# Patient Record
Sex: MALE | Race: OTHER | NOT HISPANIC OR LATINO | Employment: FULL TIME | URBAN - METROPOLITAN AREA
[De-identification: names, ages, dates, MRNs, and addresses within clinical notes are randomized per-mention and may not be internally consistent; named-entity substitution may affect disease eponyms.]

---

## 2022-11-11 ENCOUNTER — HOSPITAL ENCOUNTER (EMERGENCY)
Facility: MEDICAL CENTER | Age: 37
End: 2022-11-12
Attending: EMERGENCY MEDICINE
Payer: COMMERCIAL

## 2022-11-11 DIAGNOSIS — F10.920 ALCOHOLIC INTOXICATION WITHOUT COMPLICATION (HCC): ICD-10-CM

## 2022-11-11 LAB
ALBUMIN SERPL BCP-MCNC: 4.5 G/DL (ref 3.2–4.9)
ALBUMIN/GLOB SERPL: 1.4 G/DL
ALP SERPL-CCNC: 46 U/L (ref 30–99)
ALT SERPL-CCNC: 19 U/L (ref 2–50)
ANION GAP SERPL CALC-SCNC: 18 MMOL/L (ref 7–16)
AST SERPL-CCNC: 26 U/L (ref 12–45)
BASOPHILS # BLD AUTO: 0.5 % (ref 0–1.8)
BASOPHILS # BLD: 0.04 K/UL (ref 0–0.12)
BILIRUB SERPL-MCNC: 0.3 MG/DL (ref 0.1–1.5)
BUN SERPL-MCNC: 12 MG/DL (ref 8–22)
CALCIUM SERPL-MCNC: 8.9 MG/DL (ref 8.5–10.5)
CHLORIDE SERPL-SCNC: 100 MMOL/L (ref 96–112)
CO2 SERPL-SCNC: 19 MMOL/L (ref 20–33)
CREAT SERPL-MCNC: 1.07 MG/DL (ref 0.5–1.4)
EOSINOPHIL # BLD AUTO: 0.04 K/UL (ref 0–0.51)
EOSINOPHIL NFR BLD: 0.5 % (ref 0–6.9)
ERYTHROCYTE [DISTWIDTH] IN BLOOD BY AUTOMATED COUNT: 42.1 FL (ref 35.9–50)
ETHANOL BLD-MCNC: 276.8 MG/DL
GFR SERPLBLD CREATININE-BSD FMLA CKD-EPI: 91 ML/MIN/1.73 M 2
GLOBULIN SER CALC-MCNC: 3.3 G/DL (ref 1.9–3.5)
GLUCOSE SERPL-MCNC: 119 MG/DL (ref 65–99)
HCT VFR BLD AUTO: 41.9 % (ref 42–52)
HGB BLD-MCNC: 14.3 G/DL (ref 14–18)
IMM GRANULOCYTES # BLD AUTO: 0.02 K/UL (ref 0–0.11)
IMM GRANULOCYTES NFR BLD AUTO: 0.2 % (ref 0–0.9)
LIPASE SERPL-CCNC: 66 U/L (ref 11–82)
LYMPHOCYTES # BLD AUTO: 2.5 K/UL (ref 1–4.8)
LYMPHOCYTES NFR BLD: 29.5 % (ref 22–41)
MCH RBC QN AUTO: 30.6 PG (ref 27–33)
MCHC RBC AUTO-ENTMCNC: 34.1 G/DL (ref 33.7–35.3)
MCV RBC AUTO: 89.7 FL (ref 81.4–97.8)
MONOCYTES # BLD AUTO: 0.51 K/UL (ref 0–0.85)
MONOCYTES NFR BLD AUTO: 6 % (ref 0–13.4)
NEUTROPHILS # BLD AUTO: 5.36 K/UL (ref 1.82–7.42)
NEUTROPHILS NFR BLD: 63.3 % (ref 44–72)
NRBC # BLD AUTO: 0 K/UL
NRBC BLD-RTO: 0 /100 WBC
PLATELET # BLD AUTO: 263 K/UL (ref 164–446)
PMV BLD AUTO: 10.2 FL (ref 9–12.9)
POTASSIUM SERPL-SCNC: 3.9 MMOL/L (ref 3.6–5.5)
PROT SERPL-MCNC: 7.8 G/DL (ref 6–8.2)
RBC # BLD AUTO: 4.67 M/UL (ref 4.7–6.1)
SODIUM SERPL-SCNC: 137 MMOL/L (ref 135–145)
WBC # BLD AUTO: 8.5 K/UL (ref 4.8–10.8)

## 2022-11-11 PROCEDURE — 80053 COMPREHEN METABOLIC PANEL: CPT

## 2022-11-11 PROCEDURE — 96374 THER/PROPH/DIAG INJ IV PUSH: CPT

## 2022-11-11 PROCEDURE — 99285 EMERGENCY DEPT VISIT HI MDM: CPT

## 2022-11-11 PROCEDURE — 700111 HCHG RX REV CODE 636 W/ 250 OVERRIDE (IP): Performed by: EMERGENCY MEDICINE

## 2022-11-11 PROCEDURE — 36415 COLL VENOUS BLD VENIPUNCTURE: CPT

## 2022-11-11 PROCEDURE — 85025 COMPLETE CBC W/AUTO DIFF WBC: CPT

## 2022-11-11 PROCEDURE — 700105 HCHG RX REV CODE 258: Performed by: EMERGENCY MEDICINE

## 2022-11-11 PROCEDURE — 83690 ASSAY OF LIPASE: CPT

## 2022-11-11 PROCEDURE — 82077 ASSAY SPEC XCP UR&BREATH IA: CPT

## 2022-11-11 RX ORDER — ONDANSETRON 2 MG/ML
4 INJECTION INTRAMUSCULAR; INTRAVENOUS ONCE
Status: COMPLETED | OUTPATIENT
Start: 2022-11-11 | End: 2022-11-11

## 2022-11-11 RX ORDER — SODIUM CHLORIDE 9 MG/ML
1000 INJECTION, SOLUTION INTRAVENOUS ONCE
Status: COMPLETED | OUTPATIENT
Start: 2022-11-11 | End: 2022-11-12

## 2022-11-11 RX ADMIN — SODIUM CHLORIDE 1000 ML: 9 INJECTION, SOLUTION INTRAVENOUS at 23:30

## 2022-11-11 RX ADMIN — ONDANSETRON 4 MG: 2 INJECTION INTRAMUSCULAR; INTRAVENOUS at 23:30

## 2022-11-11 ASSESSMENT — LIFESTYLE VARIABLES: DO YOU DRINK ALCOHOL: YES

## 2022-11-12 VITALS
TEMPERATURE: 97.6 F | HEIGHT: 71 IN | HEART RATE: 71 BPM | WEIGHT: 170 LBS | OXYGEN SATURATION: 97 % | BODY MASS INDEX: 23.8 KG/M2 | RESPIRATION RATE: 18 BRPM | DIASTOLIC BLOOD PRESSURE: 69 MMHG | SYSTOLIC BLOOD PRESSURE: 106 MMHG

## 2022-11-12 NOTE — ED PROVIDER NOTES
"ED Provider Note  Patient:Timbo Fernandez  Patient : 1985  Patient MRN: [id number]  Patient PCP: No primary care provider on file.    Admit Date: 2022  Discharge Date and Time: 22 3:54 AM  Discharge Diagnosis: Alcohol intoxication  Discharge Attending: Rayshawn Min  Discharge Service: ED Observation    ED Course  Timbo is a 37 y.o. male who was evaluated at [facility] with severe alcohol intoxication, altered mental status, nausea and vomiting.  He was at a wedding this evening.  Signed out to me by Dr. Lee.  Plan is to allow the patient discharged when he is feeling better, is appropriately clinically sober.  Patient observed for 5 hours in the emergency department.  Upon reevaluation he is ambulating, appears sober, no slurred speech.  Has normal vital signs and is alert and oriented.  Nursing has p.o. challenged and he has done well with this.  He was discharged from the Emergency Department with strict return precautions and outpatient follow-up plan.    Discharge Exam:  /62   Pulse 79   Temp 36.6 °C (97.9 °F) (Temporal)   Resp 17   Ht 1.803 m (5' 11\")   Wt 77.1 kg (170 lb)   SpO2 92%   BMI 23.71 kg/m² .    Constitutional: Awake and alert. Nontoxic  HENT:  Grossly normal  Eyes: Grossly normal  Neck: Normal range of motion  Cardiovascular: Normal heart rate   Thorax & Lungs: No respiratory distress  Abdomen: Nontender  Skin:  No pathologic rash.   Extremities: Well perfused  Psychiatric: Affect normal    Labs  Results for orders placed or performed during the hospital encounter of 22   CBC WITH DIFFERENTIAL   Result Value Ref Range    WBC 8.5 4.8 - 10.8 K/uL    RBC 4.67 (L) 4.70 - 6.10 M/uL    Hemoglobin 14.3 14.0 - 18.0 g/dL    Hematocrit 41.9 (L) 42.0 - 52.0 %    MCV 89.7 81.4 - 97.8 fL    MCH 30.6 27.0 - 33.0 pg    MCHC 34.1 33.7 - 35.3 g/dL    RDW 42.1 35.9 - 50.0 fL    Platelet Count 263 164 - 446 K/uL    MPV 10.2 9.0 - 12.9 fL    Neutrophils-Polys 63.30 44.00 - " 72.00 %    Lymphocytes 29.50 22.00 - 41.00 %    Monocytes 6.00 0.00 - 13.40 %    Eosinophils 0.50 0.00 - 6.90 %    Basophils 0.50 0.00 - 1.80 %    Immature Granulocytes 0.20 0.00 - 0.90 %    Nucleated RBC 0.00 /100 WBC    Neutrophils (Absolute) 5.36 1.82 - 7.42 K/uL    Lymphs (Absolute) 2.50 1.00 - 4.80 K/uL    Monos (Absolute) 0.51 0.00 - 0.85 K/uL    Eos (Absolute) 0.04 0.00 - 0.51 K/uL    Baso (Absolute) 0.04 0.00 - 0.12 K/uL    Immature Granulocytes (abs) 0.02 0.00 - 0.11 K/uL    NRBC (Absolute) 0.00 K/uL   COMP METABOLIC PANEL   Result Value Ref Range    Sodium 137 135 - 145 mmol/L    Potassium 3.9 3.6 - 5.5 mmol/L    Chloride 100 96 - 112 mmol/L    Co2 19 (L) 20 - 33 mmol/L    Anion Gap 18.0 (H) 7.0 - 16.0    Glucose 119 (H) 65 - 99 mg/dL    Bun 12 8 - 22 mg/dL    Creatinine 1.07 0.50 - 1.40 mg/dL    Calcium 8.9 8.5 - 10.5 mg/dL    AST(SGOT) 26 12 - 45 U/L    ALT(SGPT) 19 2 - 50 U/L    Alkaline Phosphatase 46 30 - 99 U/L    Total Bilirubin 0.3 0.1 - 1.5 mg/dL    Albumin 4.5 3.2 - 4.9 g/dL    Total Protein 7.8 6.0 - 8.2 g/dL    Globulin 3.3 1.9 - 3.5 g/dL    A-G Ratio 1.4 g/dL   LIPASE   Result Value Ref Range    Lipase 66 11 - 82 U/L   DIAGNOSTIC ALCOHOL   Result Value Ref Range    Diagnostic Alcohol 276.8 (H) <10.1 mg/dL   ESTIMATED GFR   Result Value Ref Range    GFR (CKD-EPI) 91 >60 mL/min/1.73 m 2       Radiology  No orders to display         Medications:   New Prescriptions    No medications on file       Discharge Condition: Stable    Electronically signed by: Rayshawn Min, 11/12/2022 3:54 AM

## 2022-11-12 NOTE — ED PROVIDER NOTES
"ED Provider Note    CHIEF COMPLAINT  Chief Complaint   Patient presents with    Alcohol Intoxication    Nausea/Vomiting/Diarrhea       HPI  Timbo Fernandez is a 37 y.o. male who presents to the emergency department with acute alcohol intoxication with associated nausea and vomiting.  Past medical history reportedly benign.  Today was at a wedding when he had multiple beers and ultimately had onset of the above symptoms.  Denies any other acute complaint.  Apologetic at this point.    REVIEW OF SYSTEMS  See HPI for further details. All other systems are negative.     PAST MEDICAL HISTORY       SOCIAL HISTORY  Social History     Tobacco Use    Smoking status: Not on file    Smokeless tobacco: Not on file   Substance and Sexual Activity    Alcohol use: Not on file    Drug use: Not on file    Sexual activity: Not on file       SURGICAL HISTORY  patient denies any surgical history    CURRENT MEDICATIONS  Home Medications    **Home medications have not yet been reviewed for this encounter**         ALLERGIES  Not on File    PHYSICAL EXAM  VITAL SIGNS: /73   Pulse 90   Temp 36.6 °C (97.9 °F) (Temporal)   Resp 16   Ht 1.803 m (5' 11\")   Wt 77.1 kg (170 lb)   SpO2 91%   BMI 23.71 kg/m²  @DIVINA[[id number]::@  Pulse ox interpretation: I interpret this pulse ox as normal.  Constitutional: Recurrent vomiting  HENT: Normocephalic, Atraumatic, Bilateral external ears normal. Nose normal.   Eyes: Pupils are equal and reactive.   Heart: Regular rate and rythm, no murmurs.    Lungs: Clear to auscultation bilaterally.  Skin: Warm, Dry, pale  Neurologic: Alert, Grossly non-focal.   Psychiatric: Affect normal, Judgment normal, Mood normal, intoxicated        COURSE & MEDICAL DECISION MAKING  Pertinent Labs & Imaging studies reviewed. (See chart for details)  ED observation  Start time: 2355  Family history: Unobtainable secondary to current clinical intoxication    37-year-old female presented emerged part with acute alcohol " intoxication.  Patient states that he primarily is in [place].  Do believe he had an accidental overuse of alcohol intake tonight.  He remains significant intoxicated and is not yet ambulatory at the time of signout.  Following physician will continue to monitor for ongoing clinical changes and ongoing improvement as expected.  Once he is improved as long as he has no other acute complaints or need for further emergent work-up he will be able to be discharged home    FINAL IMPRESSION  1. Alcoholic intoxication without complication (HCC)               Electronically signed by: Rudolph Lee M.D., 11/11/2022 11:25 PM

## 2022-11-12 NOTE — ED NOTES
Pt actively vomiting and unable to hold his head up. Suction in room and pt repositioned sitting up high in bed. Pt airway intact and he is breathing adequately on his own.

## 2022-11-12 NOTE — DISCHARGE INSTRUCTIONS
Please take a break from drinking.  Take Tylenol and Motrin to help with pain in the morning.  Drink plenty of fluids.  Follow-up with your PCP for further evaluation.  Thank you for coming in today.

## 2022-11-12 NOTE — ED TRIAGE NOTES
"Chief Complaint   Patient presents with    Alcohol Intoxication    Nausea/Vomiting/Diarrhea      PT BIB EMS from a wedding, per EMS pt had been drinking at the wedding and began to vomit and EMS was called. Pt somnolent, actively vomiting and unable to hold his head up upon arrival. ERP at bedside upon arrival.    /80   Pulse 91   Temp 36.6 °C (97.9 °F) (Temporal)   Resp 17   Ht 1.803 m (5' 11\")   Wt 77.1 kg (170 lb)   SpO2 94%   BMI 23.71 kg/m²    "

## 2022-11-12 NOTE — ED NOTES
[facility] independent and [facility]. Discharge instructions given and pt has no follow up questions. Vitals and condition stable for discharge